# Patient Record
Sex: FEMALE | Race: WHITE | NOT HISPANIC OR LATINO | Employment: OTHER | RURAL
[De-identification: names, ages, dates, MRNs, and addresses within clinical notes are randomized per-mention and may not be internally consistent; named-entity substitution may affect disease eponyms.]

---

## 2017-04-17 PROBLEM — I83.90 VARICOSE VEINS OF LOWER EXTREMITY: Status: ACTIVE | Noted: 2017-04-17

## 2017-04-17 PROBLEM — E78.5 DYSLIPIDEMIA: Status: ACTIVE | Noted: 2017-04-17

## 2017-04-17 PROBLEM — I10 HYPERTENSION: Status: ACTIVE | Noted: 2017-04-17

## 2017-04-17 PROBLEM — I77.9 CAROTID ARTERY DISEASE (HCC): Status: ACTIVE | Noted: 2017-04-17

## 2017-04-17 PROBLEM — E03.9 HYPOTHYROIDISM: Status: ACTIVE | Noted: 2017-04-17

## 2017-04-17 PROBLEM — J44.9 COPD (CHRONIC OBSTRUCTIVE PULMONARY DISEASE) (HCC): Status: ACTIVE | Noted: 2017-04-17

## 2017-04-17 PROBLEM — R07.9 CHEST PAIN SYNDROME: Status: ACTIVE | Noted: 2017-04-17

## 2017-04-17 PROBLEM — Z72.0 TOBACCO ABUSE: Status: ACTIVE | Noted: 2017-04-17

## 2017-04-17 PROBLEM — I48.91 ATRIAL FIBRILLATION (HCC): Status: ACTIVE | Noted: 2017-04-17

## 2017-06-06 ENCOUNTER — OFFICE VISIT (OUTPATIENT)
Dept: CARDIOLOGY | Facility: CLINIC | Age: 77
End: 2017-06-06

## 2017-06-06 VITALS
HEART RATE: 113 BPM | WEIGHT: 134.2 LBS | HEIGHT: 67 IN | SYSTOLIC BLOOD PRESSURE: 115 MMHG | DIASTOLIC BLOOD PRESSURE: 74 MMHG | BODY MASS INDEX: 21.06 KG/M2

## 2017-06-06 DIAGNOSIS — R07.9 CHEST PAIN SYNDROME: Primary | ICD-10-CM

## 2017-06-06 DIAGNOSIS — I48.0 PAROXYSMAL ATRIAL FIBRILLATION (HCC): ICD-10-CM

## 2017-06-06 DIAGNOSIS — I10 ESSENTIAL HYPERTENSION: ICD-10-CM

## 2017-06-06 DIAGNOSIS — E78.5 DYSLIPIDEMIA: ICD-10-CM

## 2017-06-06 DIAGNOSIS — I77.9 BILATERAL CAROTID ARTERY DISEASE (HCC): ICD-10-CM

## 2017-06-06 PROCEDURE — 93000 ELECTROCARDIOGRAM COMPLETE: CPT | Performed by: INTERNAL MEDICINE

## 2017-06-06 PROCEDURE — 99214 OFFICE O/P EST MOD 30 MIN: CPT | Performed by: INTERNAL MEDICINE

## 2017-06-06 RX ORDER — MELATONIN
2000 DAILY
COMMUNITY

## 2017-06-06 RX ORDER — CHLORAL HYDRATE 500 MG
1000 CAPSULE ORAL
COMMUNITY
End: 2018-02-20

## 2017-06-06 RX ORDER — CARVEDILOL 25 MG/1
25 TABLET ORAL 2 TIMES DAILY WITH MEALS
COMMUNITY
End: 2017-06-06 | Stop reason: ALTCHOICE

## 2017-06-06 RX ORDER — DOXYCYCLINE HYCLATE 100 MG/1
100 CAPSULE ORAL 2 TIMES DAILY
COMMUNITY
End: 2018-02-20

## 2017-06-06 RX ORDER — CLOPIDOGREL BISULFATE 75 MG/1
75 TABLET ORAL DAILY
COMMUNITY
End: 2018-02-20

## 2017-06-06 RX ORDER — METOPROLOL TARTRATE 50 MG/1
50 TABLET, FILM COATED ORAL 2 TIMES DAILY
Qty: 60 TABLET | Refills: 6 | Status: SHIPPED | OUTPATIENT
Start: 2017-06-06 | End: 2018-02-20

## 2017-06-06 RX ORDER — LANOLIN ALCOHOL/MO/W.PET/CERES
2500 CREAM (GRAM) TOPICAL DAILY
COMMUNITY

## 2017-06-06 NOTE — PROGRESS NOTES
Encounter Date:06/06/2017      Patient ID: Jenifer Palomares is a 76 y.o. female.    Chief Complaint: Chest Pain and Palpitations    PROBLEM LIST:   1.  Chest pain syndrome:  a. Negative Cardiolite stress test, 10/14/2008, with  ejection fraction 65%.   b. Recent hospitalization at [facility]; rule out for non-ST elevation MI with residual NYHA class II-III angina.   c. Hospitalized at [facility], 2016, incomplete database.  2. Paroxysmal atrial fibrilaltion  a. CHADS-Vasc =  5  3. Carotid artery disease:   a. Asymptomatic.   b. MRA of carotid arteries  09/17/2009, showed extensive plaque in proximal 3 cm of right internal carotid artery with 60%-70% stenosis, extensive plaque and lunar irregularities were noted, angiogram was recommended.   c. Echocardiogram 06/23/2015, showed moderate biatrial enlargement  with mild LVH, ejection 55%, mild mitral and mild tricuspid regurgitation with trace aortic insufficiency.     d. Due to asymptomatic nature of disease conservative medial therapy is felt to be the best option at this time.   e. Carotid duplex ultrasound  study, 05/05/2009:  Bilateral borderline hemodynamically significant narrowing in the carotid bulbs, left greater than right. No velocity stated.   f. Carotid ultrasound in June 2010, showed no significant progression of ICA disease.   g. On 12/01/2011,  carotid ultrasound revealing, stable 80% diameter ICA. Pattern similar to what was shown on 06/09/2010.   h. Carotid ultrasound July 2012, showing similar findings.  Based on review of the reported velocities, it appears that the right carotid stenosis  was 60% to 70%; however, it is report as up to 80%.   i. Carotid duplex study, 08/02/2013:  50% to 69% CAROLINE stenosis.  No significant stenosis of the left.   j. Carotid ultrasound, 08/14/2015, did not show any hemodynamically significant carotid artery  disease and all velocities were normal with exception of elevated external carotid artery velocity  on the right side.    4. Hypertension.   5. Dyslipidemia.   6. COPD.   7. Hypothyroidism.   8. Tobacco abuse.  9. Lower extremities varicosities.   10. Recent right lower extremity Staph cellulitis, requiring intravenous  antibiotics.      History of Present Illness  Patient presents today for follow-up of the paroxysmal atrial fibrillation, chest pain and carotid artery disease after a long hiatus.  States that she has been cared for at [facility] as following a hospitalization for a chest pain she was sent to  where she had a cardiac catheter is in study and was not found to have any significant blockage.  She was noted to have atrial fibrillation and has been started on Xarelto.  She has now come back to see us to reestablish care.  She is currently feeling well from cardiac standpoint.  Lifestyle is sedentary but she tries to ablate in the house.  Has no current chest pain shortness of breath edema palpitations or syncope.  Has been recently treated for cellulitis and suffered a cat bite.     No Known Allergies      Current Outpatient Prescriptions:   •  Calcium Carbonate-Vitamin D (CALTRATE 600+D PO), Take  by mouth., Disp: , Rfl:   •  cholecalciferol (VITAMIN D3) 1000 UNITS tablet, Take 1,000 Units by mouth 2 (Two) Times a Day., Disp: , Rfl:   •  clopidogrel (PLAVIX) 75 MG tablet, Take 75 mg by mouth Daily., Disp: , Rfl:   •  doxycycline (VIBRAMYCIN) 100 MG capsule, Take 100 mg by mouth 2 (Two) Times a Day., Disp: , Rfl:   •  Multiple Vitamins-Minerals (CENTRUM SILVER 50+WOMEN PO), Take  by mouth., Disp: , Rfl:   •  Omega-3 Fatty Acids (FISH OIL) 1000 MG capsule capsule, Take 1,000 mg by mouth Daily With Breakfast., Disp: , Rfl:   •  rivaroxaban (XARELTO) 20 MG tablet, Take 20 mg by mouth Daily., Disp: , Rfl:   •  vitamin B-12 (CYANOCOBALAMIN) 1000 MCG tablet, Take 2,500 mcg by mouth 2 (Two) Times a Day., Disp: , Rfl:   •  metoprolol tartrate (LOPRESSOR) 50 MG tablet, Take 1 tablet by mouth 2 (Two)  "Times a Day., Disp: 60 tablet, Rfl: 6    The following portions of the patient's history were reviewed and updated as appropriate: allergies, current medications, past family history, past medical history, past social history, past surgical history and problem list.    Review of Systems   Constitution: Negative for chills, fever, weight gain and weight loss.   Cardiovascular: Positive for chest pain. Negative for claudication, dyspnea on exertion, leg swelling, orthopnea, palpitations, paroxysmal nocturnal dyspnea and syncope.        No dizziness   Gastrointestinal: Negative for abdominal pain, constipation, diarrhea, nausea and vomiting.   Genitourinary:        No urinary symptoms   Neurological:        No symptoms of stroke.   All other systems reviewed and are negative.    Objective:     Blood pressure 115/74, pulse 113, height 67\" (170.2 cm), weight 134 lb 3.2 oz (60.9 kg).  Repeat heart rate was 80      Physical Exam   Constitutional: She appears well-developed and well-nourished.   HENT:   HEENT exam unremarkable.   Neck: Neck supple. No JVD present.   No carotid bruits.   Cardiovascular: Normal rate and normal heart sounds.  An irregular rhythm present.   No murmur heard.  2 plus symmetric pulses.   Pulmonary/Chest: Breath sounds normal. She exhibits no tenderness.   Abdominal:   Abdomen benign.   Musculoskeletal: She exhibits no edema.   Neurological:   Neurological exam unremarkable.   Vitals reviewed.      ECG 12 Lead  Date/Time: 6/6/2017 10:53 AM  Performed by: KEO DICKERSON  Authorized by: KEO DICKERSON   Rhythm: atrial fibrillation  Ectopy: infrequent PVCs  Clinical impression: non-specific ECG  Comments: Nonspecific ST changes               Assessment:      Diagnosis Plan   1. Chest pain syndrome, asymptomatic    2. Paroxysmal atrial fibrillation, Controlled rate, on Xarelto     3. Bilateral carotid artery disease , No significant disease in most recent carotid ultrasound, no bruit on auscultation.    "   4. Essential hypertension , Controlled       Plan:   Will get [facility] health records.  Discontinue Coreg and switch to metoprolol 50 mg twice dailyFor better control of heart rate without dropping the blood pressure.  Continue all other current medications.   FU in 6 MO, sooner as needed.  Thank you for allowing us to participate in the care of your patient.     I, Miko Chamorro MD, personally performed the services described in this documentation as scribed by the above named individual in my presence, and it is both accurate and complete.  6/6/2017  6:14 PM        Please note that portions of this note may have been completed with a voice recognition program. Efforts were made to edit the dictations, but occasionally words are mistranscribed.

## 2018-02-20 ENCOUNTER — OFFICE VISIT (OUTPATIENT)
Dept: CARDIOLOGY | Facility: CLINIC | Age: 78
End: 2018-02-20

## 2018-02-20 VITALS
WEIGHT: 133 LBS | BODY MASS INDEX: 20.88 KG/M2 | HEART RATE: 99 BPM | DIASTOLIC BLOOD PRESSURE: 78 MMHG | SYSTOLIC BLOOD PRESSURE: 134 MMHG | HEIGHT: 67 IN

## 2018-02-20 DIAGNOSIS — E78.5 DYSLIPIDEMIA: ICD-10-CM

## 2018-02-20 DIAGNOSIS — I10 ESSENTIAL HYPERTENSION: ICD-10-CM

## 2018-02-20 DIAGNOSIS — I48.0 PAROXYSMAL ATRIAL FIBRILLATION (HCC): Primary | ICD-10-CM

## 2018-02-20 PROCEDURE — 99213 OFFICE O/P EST LOW 20 MIN: CPT | Performed by: INTERNAL MEDICINE

## 2018-02-20 RX ORDER — LEVOTHYROXINE SODIUM 0.05 MG/1
50 TABLET ORAL DAILY
COMMUNITY

## 2018-02-20 RX ORDER — CARVEDILOL 25 MG/1
25 TABLET ORAL 3 TIMES DAILY
COMMUNITY
End: 2018-02-20

## 2018-02-20 RX ORDER — HYDROCODONE BITARTRATE AND ACETAMINOPHEN 10; 325 MG/1; MG/1
TABLET ORAL EVERY 6 HOURS PRN
COMMUNITY
Start: 2018-02-01

## 2018-02-20 RX ORDER — METOPROLOL TARTRATE 50 MG/1
50 TABLET, FILM COATED ORAL 2 TIMES DAILY
Qty: 60 TABLET | Refills: 6 | Status: SHIPPED | OUTPATIENT
Start: 2018-02-20 | End: 2018-09-04

## 2018-02-20 RX ORDER — ONDANSETRON 4 MG/1
4 TABLET, FILM COATED ORAL EVERY 8 HOURS PRN
COMMUNITY
End: 2019-11-19

## 2018-02-20 RX ORDER — ONDANSETRON 4 MG/1
TABLET, ORALLY DISINTEGRATING ORAL
COMMUNITY
Start: 2018-01-22 | End: 2019-11-19

## 2018-02-20 NOTE — PROGRESS NOTES
Encounter Date:02/20/2018      Patient ID: Jenifer Palomares is a 77 y.o. female.    Chief Complaint: Chest Pain      PROBLEM LIST:  1. Chest pain syndrome:  a. Negative Cardiolite stress test, 10/14/2008, with  ejection fraction 65%.   b. Recent hospitalization at [facility]; rule out for non-ST elevation MI with residual NYHA class II-III angina.   c. Hospitalized at [facility], 2016, incomplete database.  2. Paroxysmal atrial fibrilaltion  a. CHADS-Vasc =  5  3. Carotid artery disease:   a. Asymptomatic.   b. MRA of carotid arteries  09/17/2009, showed extensive plaque in proximal 3 cm of right internal carotid artery with 60%-70% stenosis, extensive plaque and lunar irregularities were noted, angiogram was recommended.   c. Echocardiogram 06/23/2015, showed moderate biatrial enlargement  with mild LVH, ejection 55%, mild mitral and mild tricuspid regurgitation with trace aortic insufficiency.     d. Due to asymptomatic nature of disease conservative medial therapy is felt to be the best option at this time.   e. Carotid duplex ultrasound  study, 05/05/2009:  Bilateral borderline hemodynamically significant narrowing in the carotid bulbs, left greater than right. No velocity stated.   f. Carotid ultrasound in June 2010, showed no significant progression of ICA disease.   g. On 12/01/2011,  carotid ultrasound revealing, stable 80% diameter ICA. Pattern similar to what was shown on 06/09/2010.   h. Carotid ultrasound July 2012, showing similar findings.  Based on review of the reported velocities, it appears that the right carotid stenosis  was 60% to 70%; however, it is report as up to 80%.   i. Carotid duplex study, 08/02/2013:  50% to 69% CAROLINE stenosis.  No significant stenosis of the left.   j. Carotid ultrasound, 08/14/2015, did not show any hemodynamically significant carotid artery  disease and all velocities were normal with exception of elevated external carotid artery velocity on the right side.  "   4. Hypertension.   5. Dyslipidemia.   6. COPD.   7. Hypothyroidism.   8. Tobacco abuse.  9. Lower extremities varicosities.   10. Recent right lower extremity Staph cellulitis, requiring intravenous  antibiotics.     History of Present Illness  Patient presents today for follow-up with a history of CAD, paroxysmal atrial fibrillation, and cardiac risk factors. Since last visit has been doing well form a cardiac standpoint but feel \"sick\" in her stomach.  States that she has been nauseous without vomiting or diarrhea and not been able to eat well and has lost some weight.  She is scheduled to see her PCP for further evaluation.  States that she has been taking Zofran with some help.  Since last visit her metoprolol was changed to carvedilol which has not provided adequate control of heart rate and she has been taking up to 3 tablets a day.  Remains active and busy with household chores however at times she feels tired and does not want to cook. Denies chest pain, shortness of breath, leg swelling, palpitations, and syncope. Remains busy and active with some chores.     No Known Allergies      Current Outpatient Prescriptions:   •  Calcium Carbonate-Vitamin D (CALTRATE 600+D PO), Take  by mouth Daily., Disp: , Rfl:   •  cholecalciferol (VITAMIN D3) 1000 UNITS tablet, Take 1,000 Units by mouth 2 (Two) Times a Day., Disp: , Rfl:   •  HYDROcodone-acetaminophen (NORCO)  MG per tablet, , Disp: , Rfl:   •  levothyroxine (SYNTHROID, LEVOTHROID) 50 MCG tablet, Take 50 mcg by mouth Daily., Disp: , Rfl:   •  Multiple Vitamins-Minerals (CENTRUM SILVER 50+WOMEN PO), Take  by mouth., Disp: , Rfl:   •  ondansetron (ZOFRAN) 4 MG tablet, Take 4 mg by mouth Every 8 (Eight) Hours As Needed for Nausea or Vomiting., Disp: , Rfl:   •  ondansetron ODT (ZOFRAN-ODT) 4 MG disintegrating tablet, , Disp: , Rfl:   •  rivaroxaban (XARELTO) 20 MG tablet, Take 20 mg by mouth Daily., Disp: , Rfl:   •  vitamin B-12 (CYANOCOBALAMIN) 1000 MCG " "tablet, Take 2,500 mcg by mouth 2 (Two) Times a Day., Disp: , Rfl:   •  metoprolol tartrate (LOPRESSOR) 50 MG tablet, Take 1 tablet by mouth 2 (Two) Times a Day., Disp: 60 tablet, Rfl: 6    The following portions of the patient's history were reviewed and updated as appropriate: allergies, current medications, past family history, past medical history, past social history, past surgical history and problem list.    ROS  Review of Systems   Constitution: Negative for chills, fever, weight gain and weight loss.   Cardiovascular: Negative for chest pain, claudication, dyspnea on exertion, leg swelling, orthopnea, palpitations, paroxysmal nocturnal dyspnea and syncope.        No dizziness   Gastrointestinal: Negative for abdominal pain, constipation, diarrhea, nausea and vomiting.   Genitourinary:        No urinary symptoms   Neurological:        No symptoms of stroke.   All other systems reviewed and are negative.    Objective:     Blood pressure 134/78, pulse 99, height 170.2 cm (67\"), weight 60.3 kg (133 lb).      Physical Exam  Constitutional: She appears well-developed and well-nourished.   HENT:   HEENT exam unremarkable.   Neck: Neck supple. No JVD present.   No carotid bruits.   Cardiovascular: Normal rate, irregular rhythm and normal heart sounds.    No murmur heard.  2 plus symmetric pulses.   Pulmonary/Chest: Breath sounds normal. Does not exhibit tenderness.   Abdominal:   Abdomen benign.   Musculoskeletal: Does not exhibit edema.   Neurological:   Neurological exam unremarkable.   Vitals reviewed.    Lab Review:   Procedures       Assessment:      Diagnosis Plan   1. Paroxysmal atrial fibrillation  inadequate rate control on carvedilol, will change back to metoprolol 50 minute grams twice a day.  Continue Xarelto.     2. Dyslipidemia  Diet and exercise.     3. Essential hypertension  Controlled.  In case you current medications.       Plan:   No definite ongoing indication for Plavix therapy and the she is " at high risk of bleeding complications therefore we will discontinue Plavix, continue Xarelto at present dose.  D/C carvedilol and changed to metoprolol 50 minute grams twice a day for better control of heart rate.  Continue rest of the current medications.  F/U  with PCP for further evaluation of GI symptoms.  FU with us in 6 MO, sooner as needed.  Thank you for allowing us to participate in the care of your patient.     Scribed for Miko Chamorro MD by Davis Carrillo. 2/20/2018  2:35 PM    I, Miko Chamorro MD, personally performed the services described in this documentation as scribed by the above named individual in my presence, and it is both accurate and complete.  2/20/2018  2:37 PM        Please note that portions of this note may have been completed with a voice recognition program. Efforts were made to edit the dictations, but occasionally words are mistranscribed.

## 2018-04-16 ENCOUNTER — OUTSIDE FACILITY SERVICE (OUTPATIENT)
Dept: CARDIOLOGY | Facility: CLINIC | Age: 78
End: 2018-04-16

## 2018-04-17 ENCOUNTER — OUTSIDE FACILITY SERVICE (OUTPATIENT)
Dept: CARDIOLOGY | Facility: CLINIC | Age: 78
End: 2018-04-17

## 2018-04-17 PROCEDURE — 99221 1ST HOSP IP/OBS SF/LOW 40: CPT | Performed by: INTERNAL MEDICINE

## 2018-04-17 PROCEDURE — 93306 TTE W/DOPPLER COMPLETE: CPT | Performed by: INTERNAL MEDICINE

## 2018-09-04 ENCOUNTER — OFFICE VISIT (OUTPATIENT)
Dept: CARDIOLOGY | Facility: CLINIC | Age: 78
End: 2018-09-04

## 2018-09-04 VITALS
HEIGHT: 67 IN | BODY MASS INDEX: 19.62 KG/M2 | WEIGHT: 125 LBS | OXYGEN SATURATION: 96 % | HEART RATE: 71 BPM | DIASTOLIC BLOOD PRESSURE: 66 MMHG | SYSTOLIC BLOOD PRESSURE: 122 MMHG

## 2018-09-04 DIAGNOSIS — I77.9 BILATERAL CAROTID ARTERY DISEASE (HCC): ICD-10-CM

## 2018-09-04 DIAGNOSIS — I48.0 PAROXYSMAL ATRIAL FIBRILLATION (HCC): Primary | ICD-10-CM

## 2018-09-04 DIAGNOSIS — E78.5 DYSLIPIDEMIA: ICD-10-CM

## 2018-09-04 DIAGNOSIS — I10 ESSENTIAL HYPERTENSION: ICD-10-CM

## 2018-09-04 PROCEDURE — 99213 OFFICE O/P EST LOW 20 MIN: CPT | Performed by: INTERNAL MEDICINE

## 2018-09-04 RX ORDER — DIGOXIN 125 MCG
125 TABLET ORAL
COMMUNITY
End: 2020-06-30 | Stop reason: SDUPTHER

## 2018-09-04 RX ORDER — CEPHALEXIN 500 MG/1
500 CAPSULE ORAL 2 TIMES DAILY
COMMUNITY
End: 2019-03-19

## 2018-09-04 RX ORDER — CARVEDILOL 12.5 MG/1
12.5 TABLET ORAL 2 TIMES DAILY WITH MEALS
COMMUNITY
End: 2019-03-19 | Stop reason: SDUPTHER

## 2018-09-04 RX ORDER — SPIRONOLACTONE 25 MG/1
25 TABLET ORAL DAILY
COMMUNITY
End: 2020-06-30 | Stop reason: SDUPTHER

## 2018-09-04 NOTE — PROGRESS NOTES
Encounter Date:09/04/2018      Patient ID: Jenifer Palomares is a 77 y.o. female.    Chief Complaint: Atrial Fibrillation      PROBLEM LIST:  1. Chest pain syndrome:  a. Negative Cardiolite stress test, 10/14/2008, with  ejection fraction 65%.   b. Recent hospitalization at [facility]; rule out for non-ST elevation MI with residual NYHA class II-III angina.   c. Hospitalized at [facility], 2016, incomplete database.  2. Paroxysmal atrial fibrilaltion  a. CHADS-Vasc =  5  3. Carotid artery disease:   a. Asymptomatic.   b. MRA of carotid arteries  09/17/2009, showed extensive plaque in proximal 3 cm of right internal carotid artery with 60%-70% stenosis, extensive plaque and lunar irregularities were noted, angiogram was recommended.   c. Echocardiogram 06/23/2015, showed moderate biatrial enlargement  with mild LVH, ejection 55%, mild mitral and mild tricuspid regurgitation with trace aortic insufficiency.     d. Due to asymptomatic nature of disease conservative medial therapy is felt to be the best option at this time.   e. Carotid duplex ultrasound  study, 05/05/2009:  Bilateral borderline hemodynamically significant narrowing in the carotid bulbs, left greater than right. No velocity stated.   f. Carotid ultrasound in June 2010, showed no significant progression of ICA disease.   g. On 12/01/2011,  carotid ultrasound revealing, stable 80% diameter ICA. Pattern similar to what was shown on 06/09/2010.   h. Carotid ultrasound July 2012, showing similar findings.  Based on review of the reported velocities, it appears that the right carotid stenosis  was 60% to 70%; however, it is report as up to 80%.   i. Carotid duplex study, 08/02/2013:  50% to 69% CAROLINE stenosis.  No significant stenosis of the left.   j. Carotid ultrasound, 08/14/2015, did not show any hemodynamically significant carotid artery  disease and all velocities were normal with exception of elevated external carotid artery velocity on the  right side.    4. Hypertension.   5. Dyslipidemia.   6. COPD.   7. Hypothyroidism.   8. Tobacco abuse.  9. Lower extremities varicosities.   10. Recent right lower extremity Staph cellulitis, requiring intravenous  antibiotics.     History of Present Illness  Patient presents today for follow-up with a history of chest pain, carotid artery disease, PAF and cardiac risk factors. Since last visit was admitted to the hospital in April due to her increased blood pressure and heart failure symptoms. Since then her symptoms have significantly improved. She continues to have swelling and cellulitis on her lower extremities and is currently on anitbiotics. Denies chest pain, shortness of breath, palpitations, and syncope. Remains busy and active limited by cane use.    No Known Allergies      Current Outpatient Prescriptions:   •  Calcium Carbonate-Vitamin D (CALTRATE 600+D PO), Take  by mouth Daily., Disp: , Rfl:   •  carvedilol (COREG) 12.5 MG tablet, Take 12.5 mg by mouth 2 (Two) Times a Day With Meals., Disp: , Rfl:   •  cephalexin (KEFLEX) 500 MG capsule, Take 500 mg by mouth 2 (Two) Times a Day., Disp: , Rfl:   •  cholecalciferol (VITAMIN D3) 1000 UNITS tablet, Take 2,000 Units by mouth Daily., Disp: , Rfl:   •  digoxin (LANOXIN) 125 MCG tablet, Take 125 mcg by mouth Daily., Disp: , Rfl:   •  HYDROcodone-acetaminophen (NORCO)  MG per tablet, Every 6 (Six) Hours As Needed., Disp: , Rfl:   •  levothyroxine (SYNTHROID, LEVOTHROID) 50 MCG tablet, Take 50 mcg by mouth Daily., Disp: , Rfl:   •  Multiple Vitamins-Minerals (CENTRUM SILVER 50+WOMEN PO), Take  by mouth Daily., Disp: , Rfl:   •  ondansetron (ZOFRAN) 4 MG tablet, Take 4 mg by mouth Every 8 (Eight) Hours As Needed for Nausea or Vomiting., Disp: , Rfl:   •  ondansetron ODT (ZOFRAN-ODT) 4 MG disintegrating tablet, , Disp: , Rfl:   •  rivaroxaban (XARELTO) 20 MG tablet, Take 20 mg by mouth Daily., Disp: , Rfl:   •  spironolactone (ALDACTONE) 25 MG tablet, Take  "25 mg by mouth Daily., Disp: , Rfl:   •  vitamin B-12 (CYANOCOBALAMIN) 1000 MCG tablet, Take 2,500 mcg by mouth Daily., Disp: , Rfl:     The following portions of the patient's history were reviewed and updated as appropriate: allergies, current medications, past family history, past medical history, past social history, past surgical history and problem list.    ROS  Review of Systems   Constitution: Negative for chills, fever, weight gain and weight loss.   Cardiovascular: Negative for chest pain, claudication, dyspnea on exertion, leg swelling, orthopnea, palpitations, paroxysmal nocturnal dyspnea and syncope.        No dizziness   Gastrointestinal: Negative for abdominal pain, constipation, diarrhea, nausea and vomiting.   Genitourinary:        No urinary symptoms   Neurological:        No symptoms of stroke.   All other systems reviewed and are negative.    Objective:     Blood pressure 122/66, pulse 71, height 170.2 cm (67\"), weight 56.7 kg (125 lb), SpO2 96 %.      Physical Exam  Constitutional: She appears well-developed and well-nourished.   HENT:   HEENT exam unremarkable.   Neck: Neck supple. No JVD present.   No carotid bruits.   Cardiovascular: Normal rate, regular rhythm and normal heart sounds.    No murmur heard.  2 plus symmetric pulses.   Pulmonary/Chest: Breath sounds normal. Does not exhibit tenderness.   Abdominal:   Abdomen benign.   Musculoskeletal: Does not exhibit edema.   Neurological:   Neurological exam unremarkable.   Vitals reviewed.    Lab Review:   Procedures       Assessment:      Diagnosis Plan   1. Paroxysmal atrial fibrillation (CMS/HCC)  Continue Xarelto   2. Bilateral carotid artery disease (CMS/HCC)     3. Essential hypertension  Well controlled with current medication regimen.    4. Dyslipidemia. LDL goal < 70. Currently not on statin therapy     Plan:   Stable cardiac status.    Continue current medications.   FU in 6 MO, sooner as needed.  Thank you for allowing us to " participate in the care of your patient.     Scribed for Miko Chamorro MD by Davis Carrillo. 9/4/2018  2:05 PM    I, Miko Chamorro MD, personally performed the services described in this documentation as scribed by the above named individual in my presence, and it is both accurate and complete.  9/4/2018  3:51 PM        Please note that portions of this note may have been completed with a voice recognition program. Efforts were made to edit the dictations, but occasionally words are mistranscribed.

## 2019-03-19 ENCOUNTER — OFFICE VISIT (OUTPATIENT)
Dept: CARDIOLOGY | Facility: CLINIC | Age: 79
End: 2019-03-19

## 2019-03-19 VITALS
DIASTOLIC BLOOD PRESSURE: 71 MMHG | BODY MASS INDEX: 19.62 KG/M2 | SYSTOLIC BLOOD PRESSURE: 146 MMHG | WEIGHT: 125 LBS | HEIGHT: 67 IN | HEART RATE: 76 BPM

## 2019-03-19 DIAGNOSIS — I48.0 PAROXYSMAL ATRIAL FIBRILLATION (HCC): Primary | ICD-10-CM

## 2019-03-19 DIAGNOSIS — I10 ESSENTIAL HYPERTENSION: ICD-10-CM

## 2019-03-19 DIAGNOSIS — E78.5 DYSLIPIDEMIA: ICD-10-CM

## 2019-03-19 DIAGNOSIS — I65.23 BILATERAL CAROTID ARTERY STENOSIS: ICD-10-CM

## 2019-03-19 PROCEDURE — 99214 OFFICE O/P EST MOD 30 MIN: CPT | Performed by: INTERNAL MEDICINE

## 2019-03-19 RX ORDER — CARVEDILOL 25 MG/1
25 TABLET ORAL 2 TIMES DAILY WITH MEALS
Qty: 180 TABLET | Refills: 3 | Status: SHIPPED | OUTPATIENT
Start: 2019-03-19 | End: 2020-06-30 | Stop reason: SDUPTHER

## 2019-03-19 NOTE — PROGRESS NOTES
Encounter Date:03/19/2019      Patient ID: Jenifer Palomares is a 78 y.o. female.    Chief Complaint: Paroxysmal atrial fibrillation (CMS/HCC)      PROBLEM LIST:  1. Chest pain syndrome:  a. Negative Cardiolite stress test, 10/14/2008, with  ejection fraction 65%.   b. Recent hospitalization at [facility]; rule out for non-ST elevation MI with residual NYHA class II-III angina.   c. Hospitalized at [facility], 2016, incomplete database.  2. Paroxysmal atrial fibrilaltion  a. CHADS-Vasc =  5  3. Carotid artery disease:   a. Asymptomatic.   b. MRA of carotid arteries  09/17/2009, showed extensive plaque in proximal 3 cm of right internal carotid artery with 60%-70% stenosis, extensive plaque and lunar irregularities were noted, angiogram was recommended.   c. Echocardiogram 06/23/2015, showed moderate biatrial enlargement  with mild LVH, ejection 55%, mild mitral and mild tricuspid regurgitation with trace aortic insufficiency.     d. Due to asymptomatic nature of disease conservative medial therapy is felt to be the best option at this time.   e. Carotid duplex, 05/05/2009: Bilateral borderline hemodynamically significant narrowing in the carotid bulbs, left greater than right. No velocity stated.   f. Carotid duplex, June 2010: No significant progression of ICA disease.   g. Carotid duplex, 12/01/2011: Stable 80% diameter ICA. Pattern similar to what was shown on 06/09/2010.   h. Carotid duplex, July 2012: Similar findings. Based on review of the reported velocities, it appears that the right carotid stenosis was 60-70%; however, it is report as up to 80%.   i. Carotid duplex, 08/02/2013: 50-69% CAROLINE stenosis. No significant stenosis of the left.   j. Carotid duplex, 08/14/2015: No significant carotid artery disease and all velocities were normal with exception of elevated external carotid artery velocity on the right side.    4. Hypertension.   5. Dyslipidemia.   6. COPD.   7. Hypothyroidism.   8. Tobacco  abuse.  9. Lower extremities varicosities.   10. Recent right lower extremity Staph cellulitis, requiring IV antibiotics.     History of Present Illness  Patient presents today for 6 month follow-up with a history of PAF, chest pain syndrome, carotid artery disease, and cardiac risk factors. Since last visit, she has been doing well overall from a cardiovascular standpoint. She does report an episode of smothering recently, and worries she might have fluid around her lungs again. Denies chest pain, shortness of breath, leg swelling, palpitations, and syncope. She admits she does not have a regular exercise regimen, but is able to perform her daily activities with no significant cardiac limitations. She is limited by leg pain due to a sore on the back of her right leg.    No Known Allergies      Current Outpatient Medications:   •  Calcium Carbonate-Vitamin D (CALTRATE 600+D PO), Take  by mouth Daily., Disp: , Rfl:   •  carvedilol (COREG) 12.5 MG tablet, Take 12.5 mg by mouth 2 (Two) Times a Day With Meals., Disp: , Rfl:   •  cholecalciferol (VITAMIN D3) 1000 UNITS tablet, Take 2,000 Units by mouth Daily., Disp: , Rfl:   •  digoxin (LANOXIN) 125 MCG tablet, Take 125 mcg by mouth Daily., Disp: , Rfl:   •  HYDROcodone-acetaminophen (NORCO)  MG per tablet, Every 6 (Six) Hours As Needed., Disp: , Rfl:   •  levothyroxine (SYNTHROID, LEVOTHROID) 50 MCG tablet, Take 50 mcg by mouth Daily., Disp: , Rfl:   •  Multiple Vitamins-Minerals (CENTRUM SILVER 50+WOMEN PO), Take  by mouth Daily., Disp: , Rfl:   •  ondansetron (ZOFRAN) 4 MG tablet, Take 4 mg by mouth Every 8 (Eight) Hours As Needed for Nausea or Vomiting., Disp: , Rfl:   •  ondansetron ODT (ZOFRAN-ODT) 4 MG disintegrating tablet, , Disp: , Rfl:   •  rivaroxaban (XARELTO) 20 MG tablet, Take 20 mg by mouth Daily., Disp: , Rfl:   •  spironolactone (ALDACTONE) 25 MG tablet, Take 25 mg by mouth Daily., Disp: , Rfl:   •  vitamin B-12 (CYANOCOBALAMIN) 1000 MCG tablet,  "Take 2,500 mcg by mouth Daily., Disp: , Rfl:     The following portions of the patient's history were reviewed and updated as appropriate: allergies, current medications, past family history, past medical history, past social history, past surgical history and problem list.    ROS  Review of Systems   Constitution: Negative for chills, fatigue, fever, generalized weakness, weight gain and weight loss.   Cardiovascular: Negative for chest pain, claudication, dyspnea on exertion, leg swelling, orthopnea, palpitations, paroxysmal nocturnal dyspnea and syncope.   Respiratory: Negative for cough, shortness of breath, snoring, and wheezing.  HENT: Negative for ear pain, nosebleeds, and tinnitus.  Gastrointestinal: Negative for abdominal pain, constipation, diarrhea, nausea and vomiting.   Genitourinary: No urinary symptoms   Neurological: Negative for dizziness, headaches, loss of balance, numbness, and symptoms of stroke.  Psychiatric: Normal mental status.     All other systems reviewed and are negative.    Objective:     /71 (BP Location: Left arm, Patient Position: Sitting)   Pulse 76   Ht 170.2 cm (67\")   Wt 56.7 kg (125 lb)   BMI 19.58 kg/m²        Physical Exam  Constitutional: Patient appears well-developed and well-nourished.   HENT: HEENT exam unremarkable.   Neck: Neck supple. No JVD present. No carotid bruits.   Cardiovascular: Normal rate, regular rhythm and normal heart sounds. No murmur heard.   2+ symmetric pulses.   Pulmonary/Chest: Breath sounds normal. Does not exhibit tenderness.   Abdominal: Abdomen benign.   Musculoskeletal: Does not exhibit edema.   Neurological: Neurological exam unremarkable.   Vitals reviewed.    Lab Review:   No recent lab results available for review.     Procedures       Assessment:      Diagnosis Plan   1. Paroxysmal atrial fibrillation (CMS/HCC)  Continue digoxin and Xarelto 20 mg.   2. Bilateral carotid artery stenosis  Stable, continue current medications.   3. " Essential hypertension  Increase carvedilol from 12.5 to 25 mg BID.   4. Dyslipidemia  Not on statin therapy, no recent lab results available for review.     Plan:   Increase carvedilol from 12.5 mg BID to 25 mg BID for better control of HR and BP.  Pt advised to take Tylenol prn for pain.  Stable cardiac status.  Continue all other current medications.   FU in 6 MO, sooner as needed.  Thank you for allowing us to participate in the care of your patient.     Scribed for Miko Chamorro MD by Glenny Gudino. 3/19/2019  2:34 PM      I, Miko Chamorro MD, personally performed the services described in this documentation as scribed by the above named individual in my presence, and it is both accurate and complete.  3/19/2019  2:35 PM        Please note that portions of this note may have been completed with a voice recognition program. Efforts were made to edit the dictations, but occasionally words are mistranscribed.

## 2019-11-19 ENCOUNTER — OFFICE VISIT (OUTPATIENT)
Dept: CARDIOLOGY | Facility: CLINIC | Age: 79
End: 2019-11-19

## 2019-11-19 VITALS
DIASTOLIC BLOOD PRESSURE: 74 MMHG | BODY MASS INDEX: 20.76 KG/M2 | HEIGHT: 65 IN | SYSTOLIC BLOOD PRESSURE: 158 MMHG | OXYGEN SATURATION: 95 % | WEIGHT: 124.6 LBS | HEART RATE: 67 BPM

## 2019-11-19 DIAGNOSIS — I48.0 PAROXYSMAL ATRIAL FIBRILLATION (HCC): Primary | ICD-10-CM

## 2019-11-19 DIAGNOSIS — I10 ESSENTIAL HYPERTENSION: ICD-10-CM

## 2019-11-19 DIAGNOSIS — E78.5 DYSLIPIDEMIA: ICD-10-CM

## 2019-11-19 DIAGNOSIS — I65.23 BILATERAL CAROTID ARTERY STENOSIS: ICD-10-CM

## 2019-11-19 PROCEDURE — 99214 OFFICE O/P EST MOD 30 MIN: CPT | Performed by: INTERNAL MEDICINE

## 2019-11-19 NOTE — PROGRESS NOTES
[facility]    Encounter Date:2019        Patient ID: Jenifer Palomares is a 79 y.o. female.  : 1940     PCP: Vane Garcia MD     Chief Complaint: PAF      PROBLEM LIST:  1. Chest pain syndrome:  a. Negative Cardiolite stress test, 10/14/2008, with  ejection fraction 65%.   b. Echocardiogram 2015, showed moderate biatrial enlargement  with mild LVH, ejection 55%, mild mitral and mild tricuspid regurgitation with trace aortic insufficiency.     c. Hospitalization at [facility]; rule out for NSTEMI with residual NYHA class II-III angina.   d. Hospitalized at [facility], 2016, incomplete database.  2. Paroxysmal atrial fibrilaltion  a. CHADS-Vasc =  5  3. Carotid artery disease:   a. Asymptomatic.   b. MRA of carotid arteries  2009, showed extensive plaque in proximal 3 cm of right ICA with 60%-70% stenosis, extensive plaque and lunar irregularities were noted, angiogram was recommended.   c. Due to asymptomatic nature of disease conservative medial therapy is felt to be the best option at this time.   d. Carotid duplex, 2009: Bilateral borderline hemodynamically significant narrowing in the carotid bulbs, left greater than right. No velocity stated.   e. Carotid duplex, 2010: No significant progression of ICA disease.   f. Carotid duplex, 2011: Stable 80% diameter ICA. Pattern similar to what was shown on 2010.   g. Carotid duplex, 2012: Similar findings. Based on review of the reported velocities, it appears that the right carotid stenosis was 60-70%; however, it is report as up to 80%.   h. Carotid duplex, 2013: 50-69% CAROLINE stenosis. No significant stenosis of the left.   i. Carotid duplex, 2015: No significant carotid artery disease and all velocities were normal with exception of elevated external carotid artery velocity on the right side.    4. Hypertension.   5. Dyslipidemia.   6. COPD.    7. Hypothyroidism.   8. Tobacco abuse.  9. Lower extremities varicosities.   10. Recent right lower extremity Staph cellulitis, requiring IV antibiotics.     History of Present Illness  Patient presents today for 6 month follow-up with a history of paroxysmal atrial fibrillation, chest pain, carotid stenosis, and cardiac risk factors. Since last visit, she has been feeling well overall from a cardiovascular standpoint. Pt does not monitor her blood pressure at home. Her physical activity is limited by arthritis and back pain. She denies chest pain, shortness of breath, leg swelling, palpitations, dizziness, and syncope.     No Known Allergies      Current Outpatient Medications:   •  Calcium Carbonate-Vitamin D (CALTRATE 600+D PO), Take  by mouth Daily., Disp: , Rfl:   •  carvedilol (COREG) 25 MG tablet, Take 1 tablet by mouth 2 (Two) Times a Day With Meals., Disp: 180 tablet, Rfl: 3  •  cholecalciferol (VITAMIN D3) 1000 UNITS tablet, Take 2,000 Units by mouth Daily., Disp: , Rfl:   •  digoxin (LANOXIN) 125 MCG tablet, Take 125 mcg by mouth Daily., Disp: , Rfl:   •  HYDROcodone-acetaminophen (NORCO)  MG per tablet, Every 6 (Six) Hours As Needed., Disp: , Rfl:   •  levothyroxine (SYNTHROID, LEVOTHROID) 50 MCG tablet, Take 50 mcg by mouth Daily., Disp: , Rfl:   •  Multiple Vitamins-Minerals (CENTRUM SILVER 50+WOMEN PO), Take  by mouth Daily., Disp: , Rfl:   •  rivaroxaban (XARELTO) 20 MG tablet, Take 20 mg by mouth Daily., Disp: , Rfl:   •  spironolactone (ALDACTONE) 25 MG tablet, Take 25 mg by mouth Daily., Disp: , Rfl:   •  vitamin B-12 (CYANOCOBALAMIN) 1000 MCG tablet, Take 2,500 mcg by mouth Daily., Disp: , Rfl:     The following portions of the patient's history were reviewed and updated as appropriate: allergies, current medications, past family history, past medical history, past social history, past surgical history and problem list.    ROS  Review of Systems   Constitution: Negative for chills,  "fatigue, fever, generalized weakness, weight gain and weight loss.   Cardiovascular: Negative for chest pain, claudication, dyspnea on exertion, leg swelling, orthopnea, palpitations, paroxysmal nocturnal dyspnea and syncope.   Respiratory: Negative for cough, shortness of breath, and wheezing.  HENT: Negative for ear pain, nosebleeds, and tinnitus.  Gastrointestinal: Negative for abdominal pain, constipation, diarrhea, nausea and vomiting.   Genitourinary: No urinary symptoms   Musculoskeletal: Negative for muscle cramps. Positive for arthritis and back pain  Neurological: Negative for dizziness, headaches, loss of balance, numbness, and symptoms of stroke.  Psychiatric: Normal mental status.     All other systems reviewed and are negative.    Objective:     /74 (BP Location: Left arm, Patient Position: Sitting)   Pulse 67   Ht 165.1 cm (65\")   Wt 56.5 kg (124 lb 9.6 oz)   SpO2 95%   BMI 20.73 kg/m²    Repeat blood pressure measurement by, Miko Chamorro MD, at 124/70      Physical Exam  Constitutional: Patient appears well-developed and well-nourished.   HENT: HEENT exam unremarkable.   Neck: Neck supple. No JVD present. No carotid bruits.   Cardiovascular: Irregularly irregular rate and rhythm and normal heart sounds. No murmur heard.   2+ symmetric pulses.   Pulmonary/Chest: Breath sounds normal. Does not exhibit tenderness.   Abdominal: Abdomen benign.   Musculoskeletal: Does not exhibit edema.   Neurological: Neurological exam unremarkable.   Vitals reviewed.    Lab Review:     Procedures       Assessment:      Diagnosis Plan   1. Paroxysmal atrial fibrillation (CMS/HCC)  In Afib today, continue Xarelto for stroke prophylaxis.   2. Bilateral carotid artery stenosis  Stable and asymptomatic. Last duplex was normal.   3. Essential hypertension  Well-controlled, continue current medications.   4. Dyslipidemia  Monitored by PCP.     Plan:   Stable cardiac status.  Continue current medications.   FU in 6 " MO, sooner as needed.  Thank you for allowing us to participate in the care of your patient.     Scribed for Miok Chamorro MD by Glenny Gudino. 11/19/2019  2:07 PM      I, Miko Chamorro MD, personally performed the services described in this documentation as scribed by the above named individual in my presence, and it is both accurate and complete.  11/19/2019  2:09 PM      Please note that portions of this note may have been completed with a voice recognition program. Efforts were made to edit the dictations, but occasionally words are mistranscribed.

## 2020-06-30 ENCOUNTER — OFFICE VISIT (OUTPATIENT)
Dept: CARDIOLOGY | Facility: CLINIC | Age: 80
End: 2020-06-30

## 2020-06-30 VITALS
HEIGHT: 65 IN | SYSTOLIC BLOOD PRESSURE: 148 MMHG | HEART RATE: 81 BPM | BODY MASS INDEX: 20.83 KG/M2 | DIASTOLIC BLOOD PRESSURE: 74 MMHG | WEIGHT: 125 LBS

## 2020-06-30 DIAGNOSIS — I10 ESSENTIAL HYPERTENSION: ICD-10-CM

## 2020-06-30 DIAGNOSIS — I48.0 PAROXYSMAL ATRIAL FIBRILLATION (HCC): Primary | ICD-10-CM

## 2020-06-30 DIAGNOSIS — E78.5 DYSLIPIDEMIA: ICD-10-CM

## 2020-06-30 DIAGNOSIS — I65.23 BILATERAL CAROTID ARTERY STENOSIS: ICD-10-CM

## 2020-06-30 PROCEDURE — 99214 OFFICE O/P EST MOD 30 MIN: CPT | Performed by: INTERNAL MEDICINE

## 2020-06-30 RX ORDER — CARVEDILOL 25 MG/1
25 TABLET ORAL 2 TIMES DAILY WITH MEALS
Qty: 60 TABLET | Refills: 11 | Status: SHIPPED | OUTPATIENT
Start: 2020-06-30 | End: 2021-07-02

## 2020-06-30 RX ORDER — SPIRONOLACTONE 25 MG/1
25 TABLET ORAL DAILY
Qty: 30 TABLET | Refills: 11 | Status: SHIPPED | OUTPATIENT
Start: 2020-06-30

## 2020-06-30 RX ORDER — DIGOXIN 125 MCG
125 TABLET ORAL
Qty: 30 TABLET | Refills: 11 | Status: SHIPPED | OUTPATIENT
Start: 2020-06-30

## 2020-06-30 NOTE — PROGRESS NOTES
[facility]    Encounter Date: 2020    Patient ID: Jenifer Palomares is a 79 y.o. female.  : 1940     PCP: Vane Garcia MD       Chief Complaint: Paroxysmal atrial fibrillation (CMS/HCC)      PROBLEM LIST:  1. Paroxysmal atrial fibrilaltion  a. CHADS-Vasc =  5 (HTN, Age > 75, Female, Vascular disease)  2. Carotid artery disease:   a. Asymptomatic.   b. MRA of carotid arteries  2009, showed extensive plaque in proximal 3 cm of right ICA with 60%-70% stenosis, extensive plaque and lunar irregularities were noted, angiogram was recommended.   c. Due to asymptomatic nature of disease conservative medial therapy is felt to be the best option at this time.   d. Carotid duplex, 2009: Bilateral borderline hemodynamically significant narrowing in the carotid bulbs, left greater than right. No velocity stated.   e. Carotid duplex, 2010: No significant progression of ICA disease.   f. Carotid duplex, 2011: Stable 80% diameter ICA. Pattern similar to what was shown on 2010.   g. Carotid duplex, 2012: Similar findings. Based on review of the reported velocities, it appears that the right carotid stenosis was 60-70%; however, it is report as up to 80%.   h. Carotid duplex, 2013: 50-69% CAROLINE stenosis. No significant stenosis of the left.   i. Carotid duplex, 2015: No significant carotid artery disease and all velocities were normal with exception of elevated external carotid artery velocity on the right side.    3. Chest pain syndrome:  a. Negative Cardiolite stress test, 10/14/2008, with  ejection fraction 65%.   b. Echocardiogram 2015: Moderate biatrial enlargement with mild LVH, EF 55%, mild MR/TR, trace AI.    c. Hospitalization at [facility]; rule out for NSTEMI with residual NYHA class II-III angina.   d. Hospitalized at [facility], 2016, incomplete database.  4. Hypertension.   5. Dyslipidemia.   6. COPD.    7. Hypothyroidism.   8. Tobacco abuse.  9. Lower extremities varicosities.   10. Recent right lower extremity Staph cellulitis, requiring IV antibiotics.     History of Present Illness  Patient presents today for a 6 month follow-up with a history of paroxysmal atrial fibrillation, carotid stenosis, and cardiac risk factors. Since last visit, she has been feeling well overall from a cardiovascular standpoint. She reports some fatigue. Patient denies chest pain, shortness of breath, edema, dizziness, and syncope. She has been under some stress due to the recent death of her brother.    No Known Allergies      Current Outpatient Medications:   •  Calcium Carbonate-Vitamin D (CALTRATE 600+D PO), Take  by mouth Daily., Disp: , Rfl:   •  carvedilol (COREG) 25 MG tablet, Take 1 tablet by mouth 2 (Two) Times a Day With Meals., Disp: 180 tablet, Rfl: 3  •  cholecalciferol (VITAMIN D3) 1000 UNITS tablet, Take 2,000 Units by mouth Daily., Disp: , Rfl:   •  digoxin (LANOXIN) 125 MCG tablet, Take 125 mcg by mouth Daily., Disp: , Rfl:   •  HYDROcodone-acetaminophen (NORCO)  MG per tablet, Every 6 (Six) Hours As Needed., Disp: , Rfl:   •  levothyroxine (SYNTHROID, LEVOTHROID) 50 MCG tablet, Take 50 mcg by mouth Daily., Disp: , Rfl:   •  Multiple Vitamins-Minerals (CENTRUM SILVER 50+WOMEN PO), Take  by mouth Daily., Disp: , Rfl:   •  rivaroxaban (XARELTO) 20 MG tablet, Take 20 mg by mouth Daily., Disp: , Rfl:   •  spironolactone (ALDACTONE) 25 MG tablet, Take 25 mg by mouth Daily., Disp: , Rfl:   •  vitamin B-12 (CYANOCOBALAMIN) 1000 MCG tablet, Take 2,500 mcg by mouth Daily., Disp: , Rfl:     The following portions of the patient's history were reviewed and updated as appropriate: allergies, current medications, past family history, past medical history, past social history, past surgical history and problem list.    ROS  Review of Systems   Constitution: Negative for chills, fever, generalized weakness. Positive for  "fatigue   Cardiovascular: Negative for chest pain, claudication, dyspnea on exertion, leg swelling, orthopnea, and syncope. Positive for palpitations.  Respiratory: Negative for cough, shortness of breath, and wheezing.  HENT: Negative for ear pain, nosebleeds, and tinnitus.  Gastrointestinal: Negative for abdominal pain, constipation, diarrhea, nausea and vomiting.   Genitourinary: No urinary symptoms.  Musculoskeletal: Negative for muscle cramps.  Neurological: Negative for dizziness, headaches, loss of balance, numbness, and symptoms of stroke.  Psychiatric: Positive for nervousness.     All other systems reviewed and are negative.        Objective:     /74 (BP Location: Left arm, Patient Position: Sitting)   Pulse 81   Ht 165.1 cm (65\")   Wt 56.7 kg (125 lb)   BMI 20.80 kg/m²    Repeat BP by : 132/70    Physical Exam  Constitutional: Patient appears well-developed and well-nourished.   HENT: HEENT exam unremarkable.   Neck: Neck supple. No JVD present. No carotid bruits.   Cardiovascular: Irregularly irregular rate and rhythm and normal heart sounds. No murmur heard.   2+ symmetric pulses.   Pulmonary/Chest: Breath sounds normal. Does not exhibit tenderness.   Abdominal: Abdomen benign.   Musculoskeletal: Does not exhibit edema.   Neurological: Neurological exam unremarkable.   Vitals reviewed.          Assessment:      Diagnosis Plan   1. Paroxysmal atrial fibrillation (CMS/HCC)  In Afib today. Continue Xarelto 20 mg daily for stroke prophylaxis. Continue digoxin 125 mcg daily.   2. Bilateral carotid artery stenosis  Stable.   3. Essential hypertension  Well-controlled, continue current medications.   4. Dyslipidemia  Monitored by PCP.     Plan:   Stable cardiac status overall.  Continue current medications.   FU in 6 MO, sooner as needed.  Thank you for allowing us to participate in the care of your patient.     Scribed for Miko Chamorro MD by Glenny Gudino. 6/30/2020  13:44     Miko SWENSON " MD Ashtyn, personally performed the services described in this documentation as scribed by the above named individual in my presence, and it is both accurate and complete.  6/30/2020  16:55        Please note that portions of this note may have been completed with a voice recognition program. Efforts were made to edit the dictations, but occasionally words are mistranscribed.

## 2021-07-05 RX ORDER — CARVEDILOL 25 MG/1
25 TABLET ORAL 2 TIMES DAILY WITH MEALS
Qty: 180 TABLET | Refills: 11 | Status: SHIPPED | OUTPATIENT
Start: 2021-07-05

## 2021-10-01 ENCOUNTER — OUTSIDE FACILITY SERVICE (OUTPATIENT)
Dept: CARDIOLOGY | Facility: CLINIC | Age: 81
End: 2021-10-01

## 2021-10-01 PROCEDURE — 93306 TTE W/DOPPLER COMPLETE: CPT | Performed by: INTERNAL MEDICINE
